# Patient Record
Sex: FEMALE | Race: WHITE | Employment: FULL TIME | URBAN - METROPOLITAN AREA
[De-identification: names, ages, dates, MRNs, and addresses within clinical notes are randomized per-mention and may not be internally consistent; named-entity substitution may affect disease eponyms.]

---

## 2017-03-23 ENCOUNTER — HOSPITAL ENCOUNTER (OUTPATIENT)
Dept: ULTRASOUND IMAGING | Age: 62
Discharge: HOME | End: 2017-03-23
Attending: FAMILY MEDICINE
Payer: COMMERCIAL

## 2017-03-23 DIAGNOSIS — E89.0 HISTORY OF THYROIDECTOMY, SUBTOTAL: ICD-10-CM

## 2017-03-23 DIAGNOSIS — E04.1 THYROID NODULE: ICD-10-CM

## 2017-03-23 PROCEDURE — 76536 US EXAM OF HEAD AND NECK: CPT

## 2017-03-23 NOTE — LETTER
4/3/2017 1:33 PM 
 
Ms. Hoover Grade [address] [place] [address] Dear Sneha Grade: 
 
Please find your most recent results below. Resulted Orders US THYROID/PARATHYROID/SOFT TISS Narrative Thyroid ultrasound HISTORY: Left neck swelling. Partial right thyroidectomy 30 years ago. Real-time sonography of the thyroid gland was performed. No prior studies are 
available for comparison. FINDINGS: The right thyroid lobe measures approximately 1.3 x 3.3 x 1.5 cm. The 
left thyroid lobe measures approximately 3.0 x 5.6 x 2.8 cm. The isthmus 
measures 5 mm. There is a complex cystic appearing mass occupying much of the 
mid and lower pole on the left measuring approximately 2.7 x 3.5 x 2.6 cm. Several additional anechoic and hypoechoic nodules are present elsewhere. The 
Largest lesion represents a cyst at the midpole right lobe measuring up to 1.2 
cm. Impression IMPRESSION: 
1. Complex cystic appearing lesion at the mid and lower pole left lobe measuring 
up to 2.7 cm in size. Tissue sampling would be recommended. 2. Several additional hypoechoic and anechoic nodules without highly suspicious 
features. A follow-up ultrasound in 6 months of these lesions would be 
recommended. DC4 The significant findings in this report have been referred to the Imaging Navigator in order to communicate to the referring provider or his/her designee 
as outlined in Section II.C.2.a.ii-iii of the ACR Practice Guideline for Communication of Diagnostic Imaging Findings. RECOMMENDATIONS: 
 
She has some thyroid nodules that need to be sampled by needle biopsy.  I am going to refer her to the thyroid nodule clinic but make sure we see her again within the next 4 weeks. Please call me if you have any questions: [phone] Sincerely, SFD US LOGIC 7 UNIT 1

## 2017-03-24 PROBLEM — E04.2 MULTIPLE THYROID NODULES: Status: ACTIVE | Noted: 2017-03-24

## 2017-03-24 NOTE — PROGRESS NOTES
She has some thyroid nodules that need to be sampled by needle biopsy. I am going to refer her to the thyroid nodule clinic but make sure we see her again within the next 4 weeks.

## 2017-03-27 ENCOUNTER — HOSPITAL ENCOUNTER (OUTPATIENT)
Dept: MAMMOGRAPHY | Age: 62
Discharge: HOME | End: 2017-03-27
Attending: FAMILY MEDICINE
Payer: COMMERCIAL

## 2017-03-27 PROCEDURE — 77067 SCR MAMMO BI INCL CAD: CPT

## 2018-02-02 PROBLEM — E11.9 TYPE 2 DIABETES MELLITUS WITHOUT COMPLICATION, WITHOUT LONG-TERM CURRENT USE OF INSULIN (HCC): Status: ACTIVE | Noted: 2018-02-02

## 2019-01-18 PROBLEM — N32.81 OAB (OVERACTIVE BLADDER): Status: ACTIVE | Noted: 2019-01-18

## 2019-01-18 PROBLEM — E66.01 SEVERE OBESITY (HCC): Status: ACTIVE | Noted: 2019-01-18

## 2019-07-26 ENCOUNTER — HOSPITAL ENCOUNTER (OUTPATIENT)
Dept: MAMMOGRAPHY | Age: 64
Discharge: HOME | End: 2019-07-26
Attending: FAMILY MEDICINE

## 2019-07-26 DIAGNOSIS — Z12.39 SCREENING FOR BREAST CANCER: ICD-10-CM

## 2019-07-26 NOTE — PROGRESS NOTES
Clint Galarza that she knows that she needs additional views with regards to her mammograms and that they are scheduled.

## 2019-07-30 ENCOUNTER — HOSPITAL ENCOUNTER (OUTPATIENT)
Dept: MAMMOGRAPHY | Age: 64
Discharge: HOME | End: 2019-07-30
Attending: FAMILY MEDICINE
Payer: COMMERCIAL

## 2019-07-30 DIAGNOSIS — R92.8 ABNORMAL SCREENING MAMMOGRAM: ICD-10-CM

## 2019-07-30 PROCEDURE — 77065 DX MAMMO INCL CAD UNI: CPT

## 2019-07-30 PROCEDURE — 76642 ULTRASOUND BREAST LIMITED: CPT

## 2019-07-30 NOTE — PROGRESS NOTES
She has some breast discharge and some benign-appearing debris in her ducts. Refer her to general surgery for an opinion.

## 2019-08-22 PROBLEM — R92.8 ABNORMAL MAMMOGRAM OF RIGHT BREAST: Status: ACTIVE | Noted: 2019-08-22

## 2020-02-13 PROBLEM — N95.2 VAGINAL ATROPHY: Status: ACTIVE | Noted: 2020-02-13

## 2020-11-27 ENCOUNTER — HOSPITAL ENCOUNTER (OUTPATIENT)
Dept: MAMMOGRAPHY | Age: 65
Discharge: HOME | End: 2020-11-27
Attending: NURSE PRACTITIONER
Payer: MEDICARE

## 2020-11-27 DIAGNOSIS — Z12.39 BREAST SCREENING: ICD-10-CM

## 2020-11-27 DIAGNOSIS — Z78.0 POSTMENOPAUSE: ICD-10-CM

## 2020-11-27 PROCEDURE — 77067 SCR MAMMO BI INCL CAD: CPT

## 2020-11-27 PROCEDURE — 77080 DXA BONE DENSITY AXIAL: CPT

## 2020-11-27 NOTE — PROGRESS NOTES
IMPRESSION:   No mammographic evidence of malignancy.        Recommend mammogram in one year. Reminder letter will be scheduled.     BI-RADS Assessment Category 2: Benign finding.      Recommend mammogram in one year. Reminder letter will be scheduled.     BI-RADS Assessment Category 2: Benign finding.

## 2021-04-10 NOTE — H&P
Name: Luz Torres      MRN: [id number]       : 1955       Age: 76 y.o. Sex: female        Nella Rollins MD       CC:  No chief complaint on file. HPI:  The patient is referred here for a colonoscopy by Rey Velasquez NP. I know the patient from incision and drainage of a left buttock abscess x 2 in , followed by a laparoscopic cholecystectomy in . The notes from Rey Velasquez of 21 say that she was referred for a colonoscopy one year ago, but did not go to the appointment. No recent abdominal pain, nausea or vomiting. Family hx of colon cancer NO      Previous colonoscopy YES. 16 years ago. Negative   BRBPR NO   Melena NO   Loss of appetite NO   Weight loss NO      Change in bowel habits NO       HISTORY:    Past Medical History:   Diagnosis Date    Anxiety     Arrhythmia     pt reports hx of occ irreg beats. No medical dx.  Calculus of gallbladder without mention of cholecystitis or obstruction     Depression     Hypertension     Severe obesity ([facility] [address] 2019    Thyroid disease     s/p partial thyroidectomy. no meds    Type 2 diabetes mellitus without complication, without long-term current use of insulin ([address] 2018     Past Surgical History:   Procedure Laterality Date    HX BLADDER SUSPENSION      HX BREAST BIOPSY Right     age 20    HX CHOLECYSTECTOMY  7/17/15    HX CYST INCISION AND DRAINAGE Right     MRSA I&D from buttock    HX HEENT Left     cataract surgery    HX HYSTERECTOMY      HX PARTIAL THYROIDECTOMY      [place] BREAST SURGERY PROCEDURE UNLISTED Right     excision small mass x2  from breast     Prior to Admission medications    Medication Sig Start Date End Date Taking? Authorizing Provider   lisinopril-hydroCHLOROthiazide (PRINZIDE, ZESTORETIC) 10-12.5 mg per tablet Take 1 Tab by mouth daily.  21   Love Barrett NP   metFORMIN (GLUCOPHAGE) 500 mg tablet Take 1 Tab by mouth two (2) times daily (with meals). 1/28/21   Love Barrett NP   potassium chloride SR (KLOR-CON 10) 10 mEq tablet Take 1 Tab by mouth daily. 1/28/21   Love Barrett NP   sertraline (ZOLOFT) 50 mg tablet Take 1 Tab by mouth daily. 1/28/21   Love Barrett NP   glucose blood VI test strips (ONETOUCH ULTRA BLUE TEST STRIP) strip by Does Not Apply route See Admin Instructions. Use to test sugars once a day 6/27/19   García Morton MD   Blood-Glucose Meter monitoring kit She has sugars twice daily mornings and evenings 4/19/18   Jaydon Branch MD   cholecalciferol, vitamin D3, (VITAMIN D3) 2,000 unit tab Take 1 Tab by mouth. Provider, Historical     Social History     Tobacco Use    Smoking status: Never Smoker    Smokeless tobacco: Never Used   Substance Use Topics    Alcohol use: Not Currently    Drug use: No     Family History   Problem Relation Age of Onset    Thyroid Disease Neg Hx     Diabetes Neg Hx     Thyroid Cancer Neg Hx      . No Known Allergies    Physical Exam:     There were no vitals taken for this visit. General: Alert, oriented, cooperative white female in no acute distress. Resp: Breathing is  non-labored. Lungs clear to auscultation without wheezing or rhonchi   CV: RRR. No murmurs, rubs or gallops appreciated. Abd: soft non-tender and non-distended without peritoneal signs. +bs    Extremities: No clubbing, cyanosis or edema. Strength intact. Assessment/Plan:  Cristo Coles is a 76 y.o. female who is here for a colonoscopy as a screening tool. 1. Off ASA for one week, if on aspirin    2. Bowel prep    3. Colonoscopy with MAC. I went through the risks of bleeding, perforation of the colon and cardiopulmonary problems that can develop with the use of anesthesia.     Magda Soto MD    FACS   4/10/2021  3:17 PM

## 2021-04-15 ENCOUNTER — ANESTHESIA EVENT (OUTPATIENT)
Dept: ENDOSCOPY | Age: 66
End: 2021-04-15
Payer: MEDICARE

## 2021-04-16 ENCOUNTER — ANESTHESIA (OUTPATIENT)
Dept: ENDOSCOPY | Age: 66
End: 2021-04-16
Payer: MEDICARE

## 2021-04-16 ENCOUNTER — HOSPITAL ENCOUNTER (OUTPATIENT)
Age: 66
Setting detail: OUTPATIENT SURGERY
Discharge: HOME | End: 2021-04-16
Attending: SURGERY | Admitting: SURGERY
Payer: MEDICARE

## 2021-04-16 VITALS
TEMPERATURE: 97.7 F | RESPIRATION RATE: 16 BRPM | HEIGHT: 61 IN | WEIGHT: 180 LBS | SYSTOLIC BLOOD PRESSURE: 120 MMHG | DIASTOLIC BLOOD PRESSURE: 71 MMHG | OXYGEN SATURATION: 97 % | HEART RATE: 58 BPM | BODY MASS INDEX: 33.99 KG/M2

## 2021-04-16 DIAGNOSIS — Z12.11 ENCOUNTER FOR SCREENING COLONOSCOPY: ICD-10-CM

## 2021-04-16 LAB
GLUCOSE BLD STRIP.AUTO-MCNC: 146 MG/DL (ref 65–100)
SERVICE CMNT-IMP: ABNORMAL

## 2021-04-16 PROCEDURE — 74011000250 HC RX REV CODE- 250: Performed by: REGISTERED NURSE

## 2021-04-16 PROCEDURE — 74011250636 HC RX REV CODE- 250/636: Performed by: REGISTERED NURSE

## 2021-04-16 PROCEDURE — 2709999900 HC NON-CHARGEABLE SUPPLY: Performed by: SURGERY

## 2021-04-16 PROCEDURE — 76060000031 HC ANESTHESIA FIRST 0.5 HR: Performed by: SURGERY

## 2021-04-16 PROCEDURE — 74011250636 HC RX REV CODE- 250/636: Performed by: ANESTHESIOLOGY

## 2021-04-16 PROCEDURE — 76040000025: Performed by: SURGERY

## 2021-04-16 PROCEDURE — 82962 GLUCOSE BLOOD TEST: CPT

## 2021-04-16 PROCEDURE — G0121 COLON CA SCRN NOT HI RSK IND: HCPCS | Performed by: SURGERY

## 2021-04-16 RX ORDER — LIDOCAINE HYDROCHLORIDE 20 MG/ML
INJECTION, SOLUTION EPIDURAL; INFILTRATION; INTRACAUDAL; PERINEURAL AS NEEDED
Status: DISCONTINUED | OUTPATIENT
Start: 2021-04-16 | End: 2021-04-16 | Stop reason: HOSPADM

## 2021-04-16 RX ORDER — SODIUM CHLORIDE 0.9 % (FLUSH) 0.9 %
5-40 SYRINGE (ML) INJECTION EVERY 8 HOURS
Status: DISCONTINUED | OUTPATIENT
Start: 2021-04-16 | End: 2021-04-16 | Stop reason: HOSPADM

## 2021-04-16 RX ORDER — SODIUM CHLORIDE, SODIUM LACTATE, POTASSIUM CHLORIDE, CALCIUM CHLORIDE 600; 310; 30; 20 MG/100ML; MG/100ML; MG/100ML; MG/100ML
100 INJECTION, SOLUTION INTRAVENOUS CONTINUOUS
Status: DISCONTINUED | OUTPATIENT
Start: 2021-04-16 | End: 2021-04-16 | Stop reason: HOSPADM

## 2021-04-16 RX ORDER — PROPOFOL 10 MG/ML
INJECTION, EMULSION INTRAVENOUS
Status: DISCONTINUED | OUTPATIENT
Start: 2021-04-16 | End: 2021-04-16 | Stop reason: HOSPADM

## 2021-04-16 RX ORDER — PROPOFOL 10 MG/ML
INJECTION, EMULSION INTRAVENOUS AS NEEDED
Status: DISCONTINUED | OUTPATIENT
Start: 2021-04-16 | End: 2021-04-16 | Stop reason: HOSPADM

## 2021-04-16 RX ORDER — SODIUM CHLORIDE 0.9 % (FLUSH) 0.9 %
5-40 SYRINGE (ML) INJECTION AS NEEDED
Status: DISCONTINUED | OUTPATIENT
Start: 2021-04-16 | End: 2021-04-16 | Stop reason: HOSPADM

## 2021-04-16 RX ADMIN — LIDOCAINE HYDROCHLORIDE 50 MG: 20 INJECTION, SOLUTION EPIDURAL; INFILTRATION; INTRACAUDAL; PERINEURAL at 10:23

## 2021-04-16 RX ADMIN — PROPOFOL 30 MG: 10 INJECTION, EMULSION INTRAVENOUS at 10:24

## 2021-04-16 RX ADMIN — SODIUM CHLORIDE, SODIUM LACTATE, POTASSIUM CHLORIDE, AND CALCIUM CHLORIDE 100 ML/HR: 600; 310; 30; 20 INJECTION, SOLUTION INTRAVENOUS at 10:10

## 2021-04-16 RX ADMIN — PROPOFOL 50 MG: 10 INJECTION, EMULSION INTRAVENOUS at 10:23

## 2021-04-16 RX ADMIN — PROPOFOL 20 MG: 10 INJECTION, EMULSION INTRAVENOUS at 10:25

## 2021-04-16 RX ADMIN — PROPOFOL 140 MCG/KG/MIN: 10 INJECTION, EMULSION INTRAVENOUS at 10:23

## 2021-04-16 NOTE — ANESTHESIA POSTPROCEDURE EVALUATION
Procedure(s):  COLONOSCOPY/ BMI 34. total IV anesthesia    Anesthesia Post Evaluation        Patient location during evaluation: PACU  Patient participation: complete - patient participated  Level of consciousness: awake and alert  Pain management: adequate  Airway patency: patent  Anesthetic complications: no  Cardiovascular status: acceptable  Respiratory status: acceptable  Hydration status: acceptable  Post anesthesia nausea and vomiting:  none      INITIAL Post-op Vital signs:   Vitals Value Taken Time   /68 04/16/21 1124   Temp 36.5 °C (97.7 °F) 04/16/21 1045   Pulse 59 04/16/21 1127   Resp 16 04/16/21 1114   SpO2 98 % 04/16/21 1126   Vitals shown include unvalidated device data.

## 2021-04-16 NOTE — INTERVAL H&P NOTE
Update History & Physical 
 
The Patient's History and Physical of 4/10/21 was reviewed with the patient and I examined the patient. There was no change. The surgical site was confirmed by the patient and me. Plan:  The risk, benefits, expected outcome, and alternative to the recommended procedure have been discussed with the patient. Patient understands and wants to proceed with the procedure.  
 
Electronically signed by Kiana Valladares MD on 4/16/2021 at 10:04 AM

## 2021-04-16 NOTE — OP NOTES
[facility]  OPERATIVE REPORT    Name:  Hernan Quiroz  MR#:  [id number]  :  1955  ACCOUNT #:  [de-identified]  DATE OF SERVICE:  2021    PREOPERATIVE DIAGNOSIS:  Request for screening colonoscopy. POSTOPERATIVE DIAGNOSES:  1. Good preparation. 2.  No masses, polyps, mucosal abnormalities noted. PROCEDURE PERFORMED:  Colonoscopy    SURGEON:  Latricia Briggs MD    ASSISTANT:  None. ANESTHESIA:  Monitored anesthesia care with Dr. Candace Baird. COMPLICATIONS:  None. SPECIMENS REMOVED:  None. IMPLANTS:  None. ESTIMATED BLOOD LOSS:  None. DRAINS:  None. HISTORY:  A 50-year-old female who I know from several previous procedures. She has had a right buttock abscess incised and drained, a left buttock abscess incised and drained, and I removed her gallbladder. The patient had not had a colonoscopy in 16 years and Dr. Yolande Simms recommended she have a colonoscopy. I went through the procedure, risks of bleeding, infection, anesthesia, perforation of colon. She was agreeable, signed the consent form. PROCEDURE:  The patient was seen in the preop area by herself. Her friend who drove her, Jocelyn Watts, was waiting in the car with her son. She did not come in the building. The patient was transported to room #4 [facility] and placed on the stretcher in left lateral decubitus position. Oxygen via nasal cannula was administered. Time-out was done identifying the patient, surgical procedure, birth date of 1955. When everyone in the room agreed, I began the procedure. The [id number] scope was advanced through the anus and all the way to the cecum. Cecum was identified with the ileocecal valve, cecal folds. External compression of the right lower quadrant corresponded to an indentation seen with the scope. The scope was slowly withdrawn over a 7-minute period of time.   There were no lesions noted in the cecum, ascending colon, transverse colon, descending or sigmoid colon. The scope was brought back to the rectum where it was retroflexed. The patient had no lesions on retroflexion. The scope was withdrawn. Digital rectal exam revealed fair sphincter tone. No masses palpated. FINDINGS:  1. Good prep. 2.  No masses, polyps. PLAN:  Repeat in 10 years or if symptoms develop.       Naveen Urrutia MD      [id number]  D:  04/16/2021 10:57  T:  04/16/2021 16:53  JOB #:  [id number]

## 2021-04-16 NOTE — ROUTINE PROCESS
Patient denies any needs. Vital signs stable. Discharge instructions given to patient and . No other concerns noted at this time. Patient wheeled out to car via wheelchair with staff.

## 2021-04-16 NOTE — ANESTHESIA PREPROCEDURE EVALUATION
Anesthetic History   No history of anesthetic complications            Review of Systems / Medical History  Patient summary reviewed, nursing notes reviewed and pertinent labs reviewed    Pulmonary  Within defined limits                 Neuro/Psych         Psychiatric history     Cardiovascular    Hypertension: well controlled        Dysrhythmias       Exercise tolerance: >4 METS     GI/Hepatic/Renal  Within defined limits              Endo/Other    Diabetes: well controlled, type 2  Hypothyroidism: well controlled  Obesity     Other Findings              Physical Exam    Airway  Mallampati: II      Mouth opening: Normal     Cardiovascular  Regular rate and rhythm,  S1 and S2 normal,  no murmur, click, rub, or gallop             Dental    Dentition: Full upper dentures     Pulmonary  Breath sounds clear to auscultation               Abdominal         Other Findings            Anesthetic Plan    ASA: 2  Anesthesia type: total IV anesthesia          Induction: Intravenous  Anesthetic plan and risks discussed with: Patient

## 2022-03-18 PROBLEM — N32.81 OAB (OVERACTIVE BLADDER): Status: ACTIVE | Noted: 2019-01-18

## 2022-03-19 PROBLEM — E66.01 SEVERE OBESITY (HCC): Status: ACTIVE | Noted: 2019-01-18

## 2022-03-19 PROBLEM — E04.2 MULTINODULAR GOITER: Status: ACTIVE | Noted: 2017-03-24

## 2022-03-19 PROBLEM — R92.8 ABNORMAL MAMMOGRAM OF RIGHT BREAST: Status: ACTIVE | Noted: 2019-08-22

## 2022-03-19 PROBLEM — E11.9 TYPE 2 DIABETES MELLITUS WITHOUT COMPLICATION, WITHOUT LONG-TERM CURRENT USE OF INSULIN (HCC): Status: ACTIVE | Noted: 2018-02-02

## 2022-03-20 PROBLEM — N95.2 VAGINAL ATROPHY: Status: ACTIVE | Noted: 2020-02-13

## 2022-04-26 ENCOUNTER — HOSPITAL ENCOUNTER (OUTPATIENT)
Dept: MAMMOGRAPHY | Age: 67
Discharge: HOME | End: 2022-04-26
Attending: NURSE PRACTITIONER
Payer: MEDICARE

## 2022-04-26 DIAGNOSIS — Z12.39 BREAST SCREENING: ICD-10-CM

## 2022-04-26 PROCEDURE — 77067 SCR MAMMO BI INCL CAD: CPT

## 2022-04-27 NOTE — PROGRESS NOTES
IMPRESSION  1. Negative bilateral mammogram.  2. Routine screening mammogram is recommended in one year.     BI-RADS 1: Negative.

## 2023-01-30 NOTE — TELEPHONE ENCOUNTER
Patient last seen 4/20/22. (Next appt scheduled for 4/20/23). Metformin 500mg BID confirmed in that office note. Called [facility] in [facility] & spoke to Patti, who confirmed that no refills remain on current medication & a new refill will need to be sent in.  Medication pended & sent to provider for review & approval.

## 2023-03-31 ENCOUNTER — APPOINTMENT (OUTPATIENT)
Dept: CT IMAGING | Age: 68
End: 2023-03-31
Payer: MEDICARE

## 2023-03-31 ENCOUNTER — APPOINTMENT (OUTPATIENT)
Dept: GENERAL RADIOLOGY | Age: 68
End: 2023-03-31
Payer: MEDICARE

## 2023-03-31 ENCOUNTER — HOSPITAL ENCOUNTER (EMERGENCY)
Age: 68
Discharge: HOME | End: 2023-03-31
Attending: EMERGENCY MEDICINE
Payer: MEDICARE

## 2023-03-31 VITALS
RESPIRATION RATE: 16 BRPM | OXYGEN SATURATION: 100 % | TEMPERATURE: 97.6 F | DIASTOLIC BLOOD PRESSURE: 63 MMHG | SYSTOLIC BLOOD PRESSURE: 115 MMHG | HEART RATE: 66 BPM | BODY MASS INDEX: 32.1 KG/M2 | WEIGHT: 170 LBS | HEIGHT: 61 IN

## 2023-03-31 DIAGNOSIS — H81.10 BENIGN PAROXYSMAL POSITIONAL VERTIGO, UNSPECIFIED LATERALITY: Primary | ICD-10-CM

## 2023-03-31 DIAGNOSIS — R53.1 GENERAL WEAKNESS: ICD-10-CM

## 2023-03-31 LAB
ALBUMIN SERPL-MCNC: 3.8 G/DL (ref 3.2–4.6)
ALBUMIN/GLOB SERPL: 1 (ref 0.4–1.6)
ALP SERPL-CCNC: 99 U/L (ref 50–136)
ALT SERPL-CCNC: 24 U/L (ref 12–65)
ANION GAP SERPL CALC-SCNC: 7 MMOL/L (ref 2–11)
AST SERPL-CCNC: 17 U/L (ref 15–37)
BASOPHILS # BLD: 0 K/UL (ref 0–0.2)
BASOPHILS NFR BLD: 1 % (ref 0–2)
BILIRUB SERPL-MCNC: 0.6 MG/DL (ref 0.2–1.1)
BILIRUB UR QL: NEGATIVE
BUN SERPL-MCNC: 17 MG/DL (ref 8–23)
CALCIUM SERPL-MCNC: 9.4 MG/DL (ref 8.3–10.4)
CHLORIDE SERPL-SCNC: 106 MMOL/L (ref 101–110)
CO2 SERPL-SCNC: 24 MMOL/L (ref 21–32)
CREAT SERPL-MCNC: 1 MG/DL (ref 0.6–1)
DIFFERENTIAL METHOD BLD: NORMAL
EOSINOPHIL # BLD: 0.1 K/UL (ref 0–0.8)
EOSINOPHIL NFR BLD: 1 % (ref 0.5–7.8)
ERYTHROCYTE [DISTWIDTH] IN BLOOD BY AUTOMATED COUNT: 12.8 % (ref 11.9–14.6)
GLOBULIN SER CALC-MCNC: 3.7 G/DL (ref 2.8–4.5)
GLUCOSE SERPL-MCNC: 201 MG/DL (ref 65–100)
GLUCOSE UR QL STRIP.AUTO: NEGATIVE MG/DL
HCT VFR BLD AUTO: 38.7 % (ref 35.8–46.3)
HGB BLD-MCNC: 13 G/DL (ref 11.7–15.4)
IMM GRANULOCYTES # BLD AUTO: 0 K/UL (ref 0–0.5)
IMM GRANULOCYTES NFR BLD AUTO: 0 % (ref 0–5)
KETONES UR-MCNC: NEGATIVE MG/DL
LACTATE SERPL-SCNC: 2 MMOL/L (ref 0.4–2)
LACTATE SERPL-SCNC: 3.1 MMOL/L (ref 0.4–2)
LEUKOCYTE ESTERASE UR QL STRIP: NEGATIVE
LYMPHOCYTES # BLD: 1.5 K/UL (ref 0.5–4.6)
LYMPHOCYTES NFR BLD: 18 % (ref 13–44)
MCH RBC QN AUTO: 28.8 PG (ref 26.1–32.9)
MCHC RBC AUTO-ENTMCNC: 33.6 G/DL (ref 31.4–35)
MCV RBC AUTO: 85.6 FL (ref 82–102)
MONOCYTES # BLD: 0.3 K/UL (ref 0.1–1.3)
MONOCYTES NFR BLD: 4 % (ref 4–12)
NEUTS SEG # BLD: 6.1 K/UL (ref 1.7–8.2)
NEUTS SEG NFR BLD: 76 % (ref 43–78)
NITRITE UR QL: NEGATIVE
NRBC # BLD: 0 K/UL (ref 0–0.2)
PH UR: 6.5 (ref 5–9)
PLATELET # BLD AUTO: 224 K/UL (ref 150–450)
PMV BLD AUTO: 10 FL (ref 9.4–12.3)
POTASSIUM SERPL-SCNC: 4.1 MMOL/L (ref 3.5–5.1)
PROT SERPL-MCNC: 7.5 G/DL (ref 6.3–8.2)
PROT UR QL: NEGATIVE MG/DL
RBC # BLD AUTO: 4.52 M/UL (ref 4.05–5.2)
RBC # UR STRIP: NEGATIVE
SERVICE CMNT-IMP: NORMAL
SODIUM SERPL-SCNC: 137 MMOL/L (ref 133–143)
SP GR UR: 1.02 (ref 1–1.02)
TROPONIN I SERPL HS-MCNC: <3 PG/ML (ref 0–37)
UROBILINOGEN UR QL: 0.2 EU/DL (ref 0.2–1)
WBC # BLD AUTO: 8.1 K/UL (ref 4.3–11.1)

## 2023-03-31 PROCEDURE — 80053 COMPREHEN METABOLIC PANEL: CPT

## 2023-03-31 PROCEDURE — 6360000002 HC RX W HCPCS: Performed by: EMERGENCY MEDICINE

## 2023-03-31 PROCEDURE — 96361 HYDRATE IV INFUSION ADD-ON: CPT

## 2023-03-31 PROCEDURE — 2580000003 HC RX 258: Performed by: EMERGENCY MEDICINE

## 2023-03-31 PROCEDURE — 81003 URINALYSIS AUTO W/O SCOPE: CPT

## 2023-03-31 PROCEDURE — 84484 ASSAY OF TROPONIN QUANT: CPT

## 2023-03-31 PROCEDURE — 96360 HYDRATION IV INFUSION INIT: CPT

## 2023-03-31 PROCEDURE — 71045 X-RAY EXAM CHEST 1 VIEW: CPT

## 2023-03-31 PROCEDURE — 6370000000 HC RX 637 (ALT 250 FOR IP): Performed by: EMERGENCY MEDICINE

## 2023-03-31 PROCEDURE — 74177 CT ABD & PELVIS W/CONTRAST: CPT

## 2023-03-31 PROCEDURE — 70450 CT HEAD/BRAIN W/O DYE: CPT

## 2023-03-31 PROCEDURE — 85025 COMPLETE CBC W/AUTO DIFF WBC: CPT

## 2023-03-31 PROCEDURE — 96375 TX/PRO/DX INJ NEW DRUG ADDON: CPT

## 2023-03-31 PROCEDURE — 99285 EMERGENCY DEPT VISIT HI MDM: CPT

## 2023-03-31 PROCEDURE — 96365 THER/PROPH/DIAG IV INF INIT: CPT

## 2023-03-31 PROCEDURE — 6360000004 HC RX CONTRAST MEDICATION: Performed by: EMERGENCY MEDICINE

## 2023-03-31 PROCEDURE — 87040 BLOOD CULTURE FOR BACTERIA: CPT

## 2023-03-31 PROCEDURE — 83605 ASSAY OF LACTIC ACID: CPT

## 2023-03-31 RX ORDER — 0.9 % SODIUM CHLORIDE 0.9 %
100 INTRAVENOUS SOLUTION INTRAVENOUS ONCE
Status: COMPLETED | OUTPATIENT
Start: 2023-03-31 | End: 2023-03-31

## 2023-03-31 RX ORDER — MECLIZINE HYDROCHLORIDE 25 MG/1
25 TABLET ORAL 3 TIMES DAILY PRN
Qty: 15 TABLET | Refills: 0 | Status: SHIPPED | OUTPATIENT
Start: 2023-03-31 | End: 2023-04-10

## 2023-03-31 RX ORDER — MECLIZINE HYDROCHLORIDE 25 MG/1
50 TABLET ORAL
Status: COMPLETED | OUTPATIENT
Start: 2023-03-31 | End: 2023-03-31

## 2023-03-31 RX ORDER — ONDANSETRON 2 MG/ML
4 INJECTION INTRAMUSCULAR; INTRAVENOUS ONCE
Status: COMPLETED | OUTPATIENT
Start: 2023-03-31 | End: 2023-03-31

## 2023-03-31 RX ORDER — 0.9 % SODIUM CHLORIDE 0.9 %
30 INTRAVENOUS SOLUTION INTRAVENOUS
Status: COMPLETED | OUTPATIENT
Start: 2023-03-31 | End: 2023-03-31

## 2023-03-31 RX ORDER — SODIUM CHLORIDE 0.9 % (FLUSH) 0.9 %
10 SYRINGE (ML) INJECTION
Status: COMPLETED | OUTPATIENT
Start: 2023-03-31 | End: 2023-03-31

## 2023-03-31 RX ORDER — 0.9 % SODIUM CHLORIDE 0.9 %
1000 INTRAVENOUS SOLUTION INTRAVENOUS ONCE
Status: COMPLETED | OUTPATIENT
Start: 2023-03-31 | End: 2023-03-31

## 2023-03-31 RX ADMIN — CEFTRIAXONE 1000 MG: 1 INJECTION, POWDER, FOR SOLUTION INTRAMUSCULAR; INTRAVENOUS at 19:47

## 2023-03-31 RX ADMIN — ONDANSETRON 4 MG: 2 INJECTION INTRAMUSCULAR; INTRAVENOUS at 18:36

## 2023-03-31 RX ADMIN — SODIUM CHLORIDE 100 ML: 9 INJECTION, SOLUTION INTRAVENOUS at 20:38

## 2023-03-31 RX ADMIN — SODIUM CHLORIDE 2313 ML: 9 INJECTION, SOLUTION INTRAVENOUS at 19:47

## 2023-03-31 RX ADMIN — IOPAMIDOL 100 ML: 755 INJECTION, SOLUTION INTRAVENOUS at 20:38

## 2023-03-31 RX ADMIN — SODIUM CHLORIDE, PRESERVATIVE FREE 10 ML: 5 INJECTION INTRAVENOUS at 20:39

## 2023-03-31 RX ADMIN — MECLIZINE HYDROCHLORIDE 50 MG: 25 TABLET ORAL at 18:36

## 2023-03-31 RX ADMIN — SODIUM CHLORIDE 1000 ML: 9 INJECTION, SOLUTION INTRAVENOUS at 18:36

## 2023-03-31 ASSESSMENT — PAIN DESCRIPTION - DESCRIPTORS: DESCRIPTORS: SHOOTING

## 2023-03-31 ASSESSMENT — PAIN DESCRIPTION - LOCATION: LOCATION: ABDOMEN

## 2023-03-31 ASSESSMENT — PAIN SCALES - GENERAL: PAINLEVEL_OUTOF10: 3

## 2023-03-31 ASSESSMENT — ENCOUNTER SYMPTOMS
NAUSEA: 1
BLOOD IN STOOL: 0

## 2023-03-31 ASSESSMENT — LIFESTYLE VARIABLES
HOW OFTEN DO YOU HAVE A DRINK CONTAINING ALCOHOL: NEVER
HOW MANY STANDARD DRINKS CONTAINING ALCOHOL DO YOU HAVE ON A TYPICAL DAY: PATIENT DOES NOT DRINK

## 2023-03-31 ASSESSMENT — PAIN - FUNCTIONAL ASSESSMENT: PAIN_FUNCTIONAL_ASSESSMENT: NONE - DENIES PAIN

## 2023-03-31 NOTE — ED PROVIDER NOTES
extracranial soft tissues:    Normal calvarium. Orbits unremarkable. The visualized paranasal sinuses are   clear. No mastoid or middle ear effusions. Impression    1. No acute intracranial abnormality. 2.  Age commensurate senescent changes. Erika Miller M.D.   3/31/2023 6:56:00 PM   CT ABDOMEN PELVIS W IV CONTRAST Additional Contrast? None    Narrative    CT OF THE ABDOMEN AND PELVIS WITH CONTRAST    Clinical indication: Abdominal pain. Comparison: None. Procedure: Iodinated contrast was administered intravenously without incident. CT images of the abdomen and pelvis were obtained. CT dose lowering techniques   were used, to include: automated exposure control, adjustment for patient size,   and or use of iterative reconstruction. Findings:     Lung Bases: Lung bases are clear. No pleural effusion. Heart size is normal   without pericardial effusion. Liver and biliary system: The liver is normal in size and configuration without   focal lesion. No intra or extrahepatic biliary ductal dilatation is noted. The   gallbladder is removed. Pancreas: The pancreas is unremarkable. Spleen: The spleen is normal.    Adrenals: The adrenal glands are within normal limits. Kidneys: The kidneys are symmetric in size and enhancement and without   hydronephrosis. Gastrointestinal: The stomach and scattered loops of small and large bowel have   a nonobstructive pattern. No focal mucosal lesion or inflammatory changes are   seen. The appendix is normal in the right lower quadrant. Mesentery and retroperitoneum: No mesenteric or retroperitoneal adenopathy. No   abnormal fluid collection, mass or free air. Pelvis: The urinary bladder is moderately distended with a smooth contour. Rectum is normal. No free fluid. No deep pelvic or inguinal adenopathy. Reproductive: No acute abnormality. Body wall: Normal.    Bones: No acute osseous abnormality.       Impression Impression: No acute abnormality seen in the abdomen or pelvis.      Lanre Porter M.D.   3/31/2023 9:22:00 PM   CBC with Auto Differential   Result Value Ref Range    WBC 8.1 4.3 - 11.1 K/uL    RBC 4.52 4.05 - 5.2 M/uL    Hemoglobin 13.0 11.7 - 15.4 g/dL    Hematocrit 38.7 35.8 - 46.3 %    MCV 85.6 82 - 102 FL    MCH 28.8 26.1 - 32.9 PG    MCHC 33.6 31.4 - 35.0 g/dL    RDW 12.8 11.9 - 14.6 %    Platelets 932 160 - 497 K/uL    MPV 10.0 9.4 - 12.3 FL    nRBC 0.00 0.0 - 0.2 K/uL    Differential Type AUTOMATED      Seg Neutrophils 76 43 - 78 %    Lymphocytes 18 13 - 44 %    Monocytes 4 4.0 - 12.0 %    Eosinophils % 1 0.5 - 7.8 %    Basophils 1 0.0 - 2.0 %    Immature Granulocytes 0 0.0 - 5.0 %    Segs Absolute 6.1 1.7 - 8.2 K/UL    Absolute Lymph # 1.5 0.5 - 4.6 K/UL    Absolute Mono # 0.3 0.1 - 1.3 K/UL    Absolute Eos # 0.1 0.0 - 0.8 K/UL    Basophils Absolute 0.0 0.0 - 0.2 K/UL    Absolute Immature Granulocyte 0.0 0.0 - 0.5 K/UL   CMP   Result Value Ref Range    Sodium 137 133 - 143 mmol/L    Potassium 4.1 3.5 - 5.1 mmol/L    Chloride 106 101 - 110 mmol/L    CO2 24 21 - 32 mmol/L    Anion Gap 7 2 - 11 mmol/L    Glucose 201 (H) 65 - 100 mg/dL    BUN 17 8 - 23 MG/DL    Creatinine 1.00 0.6 - 1.0 MG/DL    Est, Glom Filt Rate >60 >60 ml/min/1.73m2    Calcium 9.4 8.3 - 10.4 MG/DL    Total Bilirubin 0.6 0.2 - 1.1 MG/DL    ALT 24 12 - 65 U/L    AST 17 15 - 37 U/L    Alk Phosphatase 99 50 - 136 U/L    Total Protein 7.5 6.3 - 8.2 g/dL    Albumin 3.8 3.2 - 4.6 g/dL    Globulin 3.7 2.8 - 4.5 g/dL    Albumin/Globulin Ratio 1.0 0.4 - 1.6     Lactate, Sepsis   Result Value Ref Range    Lactic Acid, Sepsis 3.1 (H) 0.4 - 2.0 MMOL/L   Lactate, Sepsis   Result Value Ref Range    Lactic Acid, Sepsis 2.0 0.4 - 2.0 MMOL/L   Troponin   Result Value Ref Range    Troponin, High Sensitivity <3.0 0 - 37 pg/mL   POCT Urinalysis no Micro   Result Value Ref Range    Specific Gravity, Urine, POC 1.020 1.001 - 1.023      pH, Urine, POC 6.5 5.0 -

## 2023-03-31 NOTE — ED TRIAGE NOTES
Pt arrives to ER via EMS from home with reports of dizziness, nausea, vomiting x 1 day. Pt denies SOB, CP. Endorses abd pain. EMS vitals include 164/68, 80HR, 196bgl, 100% on RA. EMS gave 4mg zofran, placed 20g left ac.

## 2023-04-02 LAB
BACTERIA SPEC CULT: NORMAL
BACTERIA SPEC CULT: NORMAL
SERVICE CMNT-IMP: NORMAL
SERVICE CMNT-IMP: NORMAL

## 2023-04-03 ENCOUNTER — CARE COORDINATION (OUTPATIENT)
Dept: CARE COORDINATION | Facility: CLINIC | Age: 68
End: 2023-04-03

## 2023-04-03 NOTE — CARE COORDINATION
Ambulatory Care Management Outreach Attempt    This patient was received as a referral from  Daily assignment for case management of ED Utilizer. Attempted to reach patient for ED follow up. Unable to reach patient. Patient: Anastasia Kinsey Patient : 1955 MRN: [id number]    Last Discharge  Street       Date Complaint Diagnosis Description Type Department Provider    3/31/23 Emesis Benign paroxysmal positional vertigo, unspecified laterality . ..  ED (DISCHARGE) [facility] Ella Marsh MD                Noted following upcoming appointments from discharge chart review:   [facility] follow up appointment(s):   Future Appointments   Date Time Provider Ximena Brooks   2023  1:00 PM NIKA Iyer - CNP [facility]   2024 10:00 AM Alexandr Rivera MD [facility] AM[facility]

## 2023-04-04 ENCOUNTER — CARE COORDINATION (OUTPATIENT)
Dept: CARE COORDINATION | Facility: CLINIC | Age: 68
End: 2023-04-04

## 2023-04-04 NOTE — CARE COORDINATION
2nd attempt to reach pt to offer ACM services. Unable to connect with pt, will attempt again in 5 working days.

## 2023-04-07 ENCOUNTER — CARE COORDINATION (OUTPATIENT)
Dept: CARE COORDINATION | Facility: CLINIC | Age: 68
End: 2023-04-07

## 2023-04-19 ENCOUNTER — CARE COORDINATION (OUTPATIENT)
Dept: CARE COORDINATION | Facility: CLINIC | Age: 68
End: 2023-04-19

## 2023-04-19 NOTE — CARE COORDINATION
Ambulatory Care Management Outreach Attempt    3rd and final attempt to reach pt. I will be sending a letter int he mail and signing off on pt's chart. Attempted to reach patient for ED follow up. Unable to reach patient. Patient: Scar Delgadillo Patient : 1955 MRN: [id number]    Last Discharge  Street       Date Complaint Diagnosis Description Type Department Provider    3/31/23 Emesis Benign paroxysmal positional vertigo, unspecified laterality . .. ED (DISCHARGE) [facility] Ella Jones MD                Noted following upcoming appointments from discharge chart review:   [facility] follow up appointment(s):   Future Appointments   Date Time Provider Ximena Brooks   2024 10:00 AM Estephanie Acevedo MD [facility]       Dear Scar Delgadillo    My name is Taqueria Castillo and I am a Ambulatory Care Manager who partners with Los Hernandez MD) to improve patients' health. I've been trying to reach you via phone to let you know that, as a member of your care team, I will work with other providers involved in your care, offer education for your specific health conditions, and connect you with more resources as needed. This program is designed to provide you with the opportunity to have a ([facility]) care manager partner with you for the following situations:     1) if you come home from the hospital or emergency room   2) to help manage your disease   3) when you would like assistance coordinating services or appointments    This added support is provided at no additional cost to you. My primary focus is to help you achieve specific goals and improve your health. Please call me at [phone] to further discuss how I can support your health care needs.     Sincerely,    Taqueria Castillo

## 2023-08-22 NOTE — PROGRESS NOTES
LVm requesting a call back 68-year-old male patient, history of hypertension, hyperlipidemia, hypothyroidism and recovering from fall with a pelvic fracture within 6 months or so.  Patient medication list includes a Zartan for blood pressure a statin for hyperlipidemia and Synthroid for hypothyroidism.  No allergies to medication.  Patient presents today for episode of expressive aphasia that started around 2 PM and was witnessed by wife.  Episode was very sudden and patient could not express what he was trying to say.  When asked was asking what he was trying to say he just kept saying I cannot, I cannot.  Patient had been in his usual state of health this morning and was speaking normally and walking normally and then the wife noticed the deficit around 2 PM.  No focal weakness or sensation deficit otherwise.  Patient has never had any history like this in the past.  Patient has a distant history of a seizure disorder that started when he was a teenager and he has been on phenobarbital since.  Last seizure episode was approximately 40 years ago.   No headache no neck pain no fever no chills no shortness of breath no palpitations no diarrhea no fever today.  Of note patient has been recovering from a small lateral shoulder abscess that has been healing well and has been followed up by surgery.

## 2024-05-22 ENCOUNTER — TELEPHONE (OUTPATIENT)
Dept: PHARMACY | Facility: CLINIC | Age: 69
End: 2024-05-22

## 2024-05-22 NOTE — TELEPHONE ENCOUNTER
Dr. Leah Beltrán has an appointment with you tomorrow, 5/23/24, and has been identified with a care gap for Statin Use in Person With Diabetes (SUPD).    Per patient chart review: Has not taken a statin in the past per chart review and is an eligible candidate for statin therapy and initiation at next Office visit.     ADA 2023/ 2018 ACC Guidelines indicate the patient is a candidate for a high-intensity statin.    2018 ACC/AHA/ 2023 ADA Guidelines:  40-76 yo Male; Patient has:Adults 40 to 75 years of age With Diabetes (without clinical ASCVD) and  LDL 70 to 189 mg/dL (93) and elevated 10-year risk of ASCVD (ASCVD risk >20% (22%)  )- Elevated/High Risk (ACC 2018)  LDL Target goal: <70mg/dL- Aged 40-76 yo with Diabetes     Would patient benefit from statin therapy?    Thank you,  Raquel Arenas, PharmD, [facility]  [facility]  [facility] Clinical Pharmacist  Department: [phone]    =============================================================================== and [facility] CLINICAL PHARMACY: STATIN THERAPY REVIEW  Identified statin use in persons with diabetes care gap per [employer]. Records dated: 4/1/24.       STATIN GAP IDENTIFIED- Assessment      Per chart review: Chart Review: has not taken a statin in the past per chart review. And is an eligible candidate for statin therapy and initiation at next Office visit.  No Known Allergies  Lipid evaluation and Guideline Assessment      Lab Results   Component Value Date    CHOL 179 04/20/2022    TRIG 199 (H) 04/20/2022    HDL 52 04/20/2022      Latest Reference Range & Units 04/20/22 15:11   LDL Cholesterol 0 - 99 mg/dL 93     ALT   Date Value Ref Range Status   03/31/2023 24 12 - 65 U/L Final     AST   Date Value Ref Range Status   03/31/2023 17 15 - 37 U/L Final     The 10-year ASCVD risk score (Daya SANCHES, et al., 2019) is: 22.1%    Values used to calculate the score:      Age: 68 years      Sex: Female      Is

## 2024-05-23 NOTE — TELEPHONE ENCOUNTER
Thank you for the update.     Raquel Arenas, PharmD, [facility]  [facility]  [facility] Clinical Pharmacist  Department: [phone]

## 2024-06-18 ENCOUNTER — TELEPHONE (OUTPATIENT)
Dept: PHARMACY | Facility: CLINIC | Age: 69
End: 2024-06-18

## 2024-06-18 NOTE — TELEPHONE ENCOUNTER
Meghan Beltrán has an appointment with you tomorrow, 5/23/24, and has been identified with a care gap for Statin Use in Person With Diabetes (SUPD).     Per patient chart review: Has not taken a statin in the past per chart review and is an eligible candidate for statin therapy and initiation at next Office visit.      ADA 2023/ 2018 ACC Guidelines indicate the patient is a candidate for a high-intensity statin.     2018 ACC/AHA/ 2023 ADA Guidelines:  Patient has:Adults 40 to 75 years of age With Diabetes (without clinical ASCVD) and  LDL 70 to 189 mg/dL (93) and elevated 10-year risk of ASCVD (ASCVD risk >20% (22%)  )- Elevated/High Risk (ACC 2018)  LDL Target goal: <70mg/dL- Aged 40-74 yo with Diabetes     Will fax pcp.      Thank you,  Raquel Arenas, PharmD, [facility]  [facility] Clinical Pharmacist  Department: [phone]    For Pharmacy Admin Tracking Only    Program: [facility] [employer]  CPA in place:  No  Recommendation Provided To: Provider: 1 via Fax sent to office  Intervention Detail: New Rx: 1, reason: Needs Additional Therapy  Intervention Accepted By: Provider: 0  Gap Closed?: No   Time Spent (min): 10

## 2024-07-26 ENCOUNTER — APPOINTMENT (OUTPATIENT)
Dept: GENERAL RADIOLOGY | Age: 69
End: 2024-07-26
Payer: MEDICARE

## 2024-07-26 ENCOUNTER — HOSPITAL ENCOUNTER (EMERGENCY)
Age: 69
Discharge: HOME | End: 2024-07-26
Attending: EMERGENCY MEDICINE
Payer: MEDICARE

## 2024-07-26 VITALS
WEIGHT: 171 LBS | TEMPERATURE: 100.2 F | OXYGEN SATURATION: 100 % | SYSTOLIC BLOOD PRESSURE: 139 MMHG | RESPIRATION RATE: 18 BRPM | DIASTOLIC BLOOD PRESSURE: 66 MMHG | HEART RATE: 99 BPM | BODY MASS INDEX: 32.28 KG/M2 | HEIGHT: 61 IN

## 2024-07-26 DIAGNOSIS — U07.1 COVID-19: Primary | ICD-10-CM

## 2024-07-26 LAB
SARS-COV-2 RDRP RESP QL NAA+PROBE: DETECTED
SOURCE: ABNORMAL

## 2024-07-26 PROCEDURE — 87635 SARS-COV-2 COVID-19 AMP PRB: CPT

## 2024-07-26 PROCEDURE — 71046 X-RAY EXAM CHEST 2 VIEWS: CPT

## 2024-07-26 PROCEDURE — 99284 EMERGENCY DEPT VISIT MOD MDM: CPT

## 2024-07-26 ASSESSMENT — ENCOUNTER SYMPTOMS
NAUSEA: 0
ABDOMINAL PAIN: 0
RHINORRHEA: 0
SORE THROAT: 0
CONSTIPATION: 0
VOMITING: 0
SHORTNESS OF BREATH: 0
COUGH: 1
COLOR CHANGE: 0
BACK PAIN: 0
DIARRHEA: 0

## 2024-07-26 NOTE — ED PROVIDER NOTES
Emergency Department Provider Note       PCP: Phong Stanley MD   Age: 69 y.o.   Sex: female     DISPOSITION Decision To Discharge 07/26/2024 08:09:58 PM       ICD-10-CM    1. COVID-19  U07.1           Medical Decision Making     Patient with coworker with COVID.  She is positive for COVID here.  No acute on chest x-ray.  Will discharge with rest at home.     1 or more acute illnesses that pose a threat to life or bodily function.   Patient was discharged risks and benefits of hospitalization were considered.  Shared medical decision making was utilized in creating the patients health plan today.    I independently ordered and reviewed each unique test.       I interpreted the X-rays no infiltrate.              History     Patient started yesterday with bodyaches cough congestion and headache.  Continued today.  Recent COVID person at work.    The history is provided by the patient. No  was used.   Generalized Body Aches  Severity:  Moderate  Duration:  1 day  Timing:  Constant  Progression:  Worsening  Chronicity:  New  Associated symptoms: congestion, cough, fatigue, fever, headaches and myalgias    Associated symptoms: no abdominal pain, no chest pain, no diarrhea, no nausea, no rash, no rhinorrhea, no shortness of breath, no sore throat and no vomiting        ROS     Review of Systems   Constitutional:  Positive for fatigue and fever. Negative for chills.   HENT:  Positive for congestion. Negative for rhinorrhea and sore throat.    Respiratory:  Positive for cough. Negative for shortness of breath.    Cardiovascular:  Negative for chest pain and palpitations.   Gastrointestinal:  Negative for abdominal pain, constipation, diarrhea, nausea and vomiting.   Genitourinary:  Negative for dysuria and hematuria.   Musculoskeletal:  Positive for myalgias. Negative for back pain and neck pain.   Skin:  Negative for color change and rash.   Neurological:  Positive for headaches. Negative for

## 2024-07-26 NOTE — ED TRIAGE NOTES
Patient arrives to ED pov from home. Patient reports pain all over, cough, and headache. Patient reports this started last night. Patient reports someone at her work tested positive for Covid.

## 2024-07-27 NOTE — ED NOTES
Patient mobility status  with no difficulty. Provider aware     I have reviewed discharge instructions with the patient.  The patient verbalized understanding.    Patient left ED via Discharge Method: ambulatory to Home with  self .    Opportunity for questions and clarification provided.     Patient given 0 scripts.            Guerda Garcia RN  07/26/24 2037

## 2024-12-10 ENCOUNTER — OFFICE VISIT (OUTPATIENT)
Dept: ENDOCRINOLOGY | Age: 69
End: 2024-12-10
Payer: MEDICARE

## 2024-12-10 VITALS
SYSTOLIC BLOOD PRESSURE: 122 MMHG | WEIGHT: 169.6 LBS | OXYGEN SATURATION: 96 % | HEART RATE: 75 BPM | BODY MASS INDEX: 33.3 KG/M2 | HEIGHT: 60 IN | DIASTOLIC BLOOD PRESSURE: 84 MMHG

## 2024-12-10 DIAGNOSIS — E04.2 MULTINODULAR GOITER: Primary | ICD-10-CM

## 2024-12-10 DIAGNOSIS — E04.2 MULTINODULAR GOITER: ICD-10-CM

## 2024-12-10 LAB — TSH W FREE THYROID IF ABNORMAL: 1.2 UIU/ML (ref 0.27–4.2)

## 2024-12-10 PROCEDURE — 76536 US EXAM OF HEAD AND NECK: CPT | Performed by: INTERNAL MEDICINE

## 2024-12-10 PROCEDURE — 3074F SYST BP LT 130 MM HG: CPT | Performed by: INTERNAL MEDICINE

## 2024-12-10 PROCEDURE — 1123F ACP DISCUSS/DSCN MKR DOCD: CPT | Performed by: INTERNAL MEDICINE

## 2024-12-10 PROCEDURE — 3079F DIAST BP 80-89 MM HG: CPT | Performed by: INTERNAL MEDICINE

## 2024-12-10 PROCEDURE — 1159F MED LIST DOCD IN RCRD: CPT | Performed by: INTERNAL MEDICINE

## 2024-12-10 PROCEDURE — 99213 OFFICE O/P EST LOW 20 MIN: CPT | Performed by: INTERNAL MEDICINE

## 2024-12-10 RX ORDER — GLIMEPIRIDE 2 MG/1
2 TABLET ORAL DAILY
COMMUNITY
Start: 2023-12-31

## 2024-12-10 ASSESSMENT — ENCOUNTER SYMPTOMS
DIARRHEA: 0
CONSTIPATION: 0

## 2024-12-10 NOTE — PROGRESS NOTES
measuring 0.33 cm (TR 4).  In the mid left lobe there is a hypoechoic nodule with macrocalcifications measuring 0.98 x 1.33 x 1.58 cm (TR 4).  Just superiorly is a complex, predominantly solid isoechoic nodule measuring 0.91 x 0.97 x 0.94 cm (TR 3).  In the inferior left lobe medially there is a complex, predominantly solid isoechoic nodule measuring 1.09 x 1.68 x 2.08 cm without microcalcifications (TR 3).      Labs:  3/16/2017: TSH 2.390, total T4 8.9.  7/27/2017: TSH 0.926, total T4 7.7.  1/30/2020: TSH 1.100, free T4 1.11.  5/14/2021: TSH 0.667.      Review of Systems   Constitutional:  Negative for diaphoresis, fatigue and unexpected weight change.   Cardiovascular:  Negative for palpitations.   Gastrointestinal:  Negative for constipation and diarrhea.   Endocrine: Negative for cold intolerance and heat intolerance.   Neurological:  Negative for tremors.       Vital Signs:  /84 (Site: Right Upper Arm, Position: Sitting, Cuff Size: Medium Adult)   Pulse 75   Ht 1.524 m (5')   Wt 76.9 kg (169 lb 9.6 oz)   SpO2 96%   BMI 33.12 kg/m²     Wt Readings from Last 3 Encounters:   12/10/24 76.9 kg (169 lb 9.6 oz)   07/26/24 77.6 kg (171 lb)   06/15/23 77.1 kg (170 lb)       Physical Exam  Constitutional:       General: She is not in acute distress.  Neck:      Comments: Thyroidectomy scar.  3 cm left thyroid nodule.   Cardiovascular:      Rate and Rhythm: Normal rate and regular rhythm.   Lymphadenopathy:      Cervical: No cervical adenopathy.   Neurological:      Motor: No tremor.           Orders Placed This Encounter   Procedures    US,HEAD/NECK TISSUES,REAL TIME    TSH with Reflex     Standing Status:   Future     Standing Expiration Date:   12/10/2025       Current Outpatient Medications   Medication Sig Dispense Refill    empagliflozin (JARDIANCE) 25 MG tablet Take 1 tablet by mouth daily      glimepiride (AMARYL) 2 MG tablet Take 1 tablet by mouth daily      lisinopril-hydroCHLOROthiazide

## 2024-12-11 ENCOUNTER — TELEPHONE (OUTPATIENT)
Dept: ENDOCRINOLOGY | Age: 69
End: 2024-12-11

## 2024-12-11 NOTE — TELEPHONE ENCOUNTER
----- Message from Dr. Francisco Graff MD sent at 12/11/2024  8:18 AM EST -----  Her thyroid level was normal.

## 2025-03-04 ENCOUNTER — TRANSCRIBE ORDERS (OUTPATIENT)
Dept: SCHEDULING | Age: 70
End: 2025-03-04

## 2025-03-04 DIAGNOSIS — Z12.31 VISIT FOR SCREENING MAMMOGRAM: Primary | ICD-10-CM

## 2025-04-04 ENCOUNTER — HOSPITAL ENCOUNTER (OUTPATIENT)
Dept: MAMMOGRAPHY | Age: 70
Discharge: HOME | End: 2025-04-04
Payer: MEDICARE

## 2025-04-04 VITALS — HEIGHT: 60 IN | WEIGHT: 170 LBS | BODY MASS INDEX: 33.38 KG/M2

## 2025-04-04 DIAGNOSIS — Z12.31 VISIT FOR SCREENING MAMMOGRAM: ICD-10-CM

## 2025-04-04 PROCEDURE — 77063 BREAST TOMOSYNTHESIS BI: CPT

## 2025-07-07 ENCOUNTER — APPOINTMENT (OUTPATIENT)
Dept: GENERAL RADIOLOGY | Age: 70
End: 2025-07-07
Payer: MEDICARE

## 2025-07-07 ENCOUNTER — APPOINTMENT (OUTPATIENT)
Dept: CT IMAGING | Age: 70
End: 2025-07-07
Payer: MEDICARE

## 2025-07-07 ENCOUNTER — HOSPITAL ENCOUNTER (EMERGENCY)
Age: 70
Discharge: HOME | End: 2025-07-07
Attending: STUDENT IN AN ORGANIZED HEALTH CARE EDUCATION/TRAINING PROGRAM
Payer: MEDICARE

## 2025-07-07 VITALS
DIASTOLIC BLOOD PRESSURE: 52 MMHG | BODY MASS INDEX: 33.38 KG/M2 | TEMPERATURE: 99.2 F | HEART RATE: 69 BPM | HEIGHT: 60 IN | OXYGEN SATURATION: 95 % | RESPIRATION RATE: 25 BRPM | WEIGHT: 170 LBS | SYSTOLIC BLOOD PRESSURE: 103 MMHG

## 2025-07-07 DIAGNOSIS — R51.9 NONINTRACTABLE HEADACHE, UNSPECIFIED CHRONICITY PATTERN, UNSPECIFIED HEADACHE TYPE: ICD-10-CM

## 2025-07-07 DIAGNOSIS — R19.7 DIARRHEA, UNSPECIFIED TYPE: ICD-10-CM

## 2025-07-07 DIAGNOSIS — R11.0 NAUSEA: Primary | ICD-10-CM

## 2025-07-07 LAB
ALBUMIN SERPL-MCNC: 3.8 G/DL (ref 3.2–4.6)
ALBUMIN/GLOB SERPL: 1 (ref 1–1.9)
ALP SERPL-CCNC: 98 U/L (ref 35–104)
ALT SERPL-CCNC: 15 U/L (ref 8–45)
ANION GAP SERPL CALC-SCNC: 15 MMOL/L (ref 7–16)
APPEARANCE UR: CLEAR
AST SERPL-CCNC: 25 U/L (ref 15–37)
BACTERIA URNS QL MICRO: 0 /HPF
BASOPHILS # BLD: 0.03 K/UL (ref 0–0.2)
BASOPHILS NFR BLD: 0.3 % (ref 0–2)
BILIRUB SERPL-MCNC: 0.6 MG/DL (ref 0–1.2)
BILIRUB UR QL: NEGATIVE
BUN SERPL-MCNC: 13 MG/DL (ref 8–23)
CALCIUM SERPL-MCNC: 9.4 MG/DL (ref 8.8–10.2)
CASTS URNS QL MICRO: 0 /LPF
CHLORIDE SERPL-SCNC: 101 MMOL/L (ref 98–107)
CO2 SERPL-SCNC: 20 MMOL/L (ref 20–29)
COLOR UR: NORMAL
CREAT SERPL-MCNC: 0.89 MG/DL (ref 0.6–1.1)
CRYSTALS URNS QL MICRO: 0 /LPF
DIFFERENTIAL METHOD BLD: ABNORMAL
EOSINOPHIL # BLD: 0.04 K/UL (ref 0–0.8)
EOSINOPHIL NFR BLD: 0.4 % (ref 0.5–7.8)
EPI CELLS #/AREA URNS HPF: NORMAL /HPF
ERYTHROCYTE [DISTWIDTH] IN BLOOD BY AUTOMATED COUNT: 13 % (ref 11.9–14.6)
FLUAV RNA SPEC QL NAA+PROBE: NOT DETECTED
FLUBV RNA SPEC QL NAA+PROBE: NOT DETECTED
GLOBULIN SER CALC-MCNC: 3.9 G/DL (ref 2.3–3.5)
GLUCOSE SERPL-MCNC: 144 MG/DL (ref 70–99)
GLUCOSE UR STRIP.AUTO-MCNC: NEGATIVE MG/DL
HCT VFR BLD AUTO: 38.6 % (ref 35.8–46.3)
HGB BLD-MCNC: 13.3 G/DL (ref 11.7–15.4)
HGB UR QL STRIP: NEGATIVE
IMM GRANULOCYTES # BLD AUTO: 0.06 K/UL (ref 0–0.5)
IMM GRANULOCYTES NFR BLD AUTO: 0.6 % (ref 0–5)
KETONES UR QL STRIP.AUTO: NEGATIVE MG/DL
LACTATE SERPL-SCNC: 1.3 MMOL/L (ref 0.5–2)
LACTATE SERPL-SCNC: 2.4 MMOL/L (ref 0.5–2)
LEUKOCYTE ESTERASE UR QL STRIP.AUTO: NEGATIVE
LYMPHOCYTES # BLD: 1.06 K/UL (ref 0.5–4.6)
LYMPHOCYTES NFR BLD: 9.8 % (ref 13–44)
MCH RBC QN AUTO: 29.3 PG (ref 26.1–32.9)
MCHC RBC AUTO-ENTMCNC: 34.5 G/DL (ref 31.4–35)
MCV RBC AUTO: 85 FL (ref 82–102)
MONOCYTES # BLD: 0.3 K/UL (ref 0.1–1.3)
MONOCYTES NFR BLD: 2.8 % (ref 4–12)
MUCOUS THREADS URNS QL MICRO: 0 /LPF
NEUTS SEG # BLD: 9.3 K/UL (ref 1.7–8.2)
NEUTS SEG NFR BLD: 86.1 % (ref 43–78)
NITRITE UR QL STRIP.AUTO: NEGATIVE
NRBC # BLD: 0 K/UL (ref 0–0.2)
OTHER OBSERVATIONS: NORMAL
PH UR STRIP: 5 (ref 5–9)
PLATELET # BLD AUTO: 218 K/UL (ref 150–450)
PMV BLD AUTO: 10.1 FL (ref 9.4–12.3)
POTASSIUM SERPL-SCNC: 4.3 MMOL/L (ref 3.5–5.1)
PROCALCITONIN SERPL-MCNC: 0.18 NG/ML (ref 0–0.1)
PROT SERPL-MCNC: 7.7 G/DL (ref 6.3–8.2)
PROT UR STRIP-MCNC: NEGATIVE MG/DL
RBC # BLD AUTO: 4.54 M/UL (ref 4.05–5.2)
RBC #/AREA URNS HPF: 0 /HPF
SARS-COV-2 RDRP RESP QL NAA+PROBE: NOT DETECTED
SODIUM SERPL-SCNC: 136 MMOL/L (ref 136–145)
SOURCE: NORMAL
SP GR UR REFRACTOMETRY: 1.02 (ref 1–1.02)
URINE CULTURE IF INDICATED: NORMAL
UROBILINOGEN UR QL STRIP.AUTO: 0.2 EU/DL (ref 0.2–1)
WBC # BLD AUTO: 10.8 K/UL (ref 4.3–11.1)
WBC URNS QL MICRO: 0 /HPF
YEAST URNS QL MICRO: NORMAL

## 2025-07-07 PROCEDURE — 99284 EMERGENCY DEPT VISIT MOD MDM: CPT

## 2025-07-07 PROCEDURE — 70450 CT HEAD/BRAIN W/O DYE: CPT

## 2025-07-07 PROCEDURE — 6370000000 HC RX 637 (ALT 250 FOR IP): Performed by: STUDENT IN AN ORGANIZED HEALTH CARE EDUCATION/TRAINING PROGRAM

## 2025-07-07 PROCEDURE — 96365 THER/PROPH/DIAG IV INF INIT: CPT

## 2025-07-07 PROCEDURE — 80053 COMPREHEN METABOLIC PANEL: CPT

## 2025-07-07 PROCEDURE — 84145 PROCALCITONIN (PCT): CPT

## 2025-07-07 PROCEDURE — 96375 TX/PRO/DX INJ NEW DRUG ADDON: CPT

## 2025-07-07 PROCEDURE — 71045 X-RAY EXAM CHEST 1 VIEW: CPT

## 2025-07-07 PROCEDURE — 83605 ASSAY OF LACTIC ACID: CPT

## 2025-07-07 PROCEDURE — 87636 SARSCOV2 & INF A&B AMP PRB: CPT

## 2025-07-07 PROCEDURE — 85025 COMPLETE CBC W/AUTO DIFF WBC: CPT

## 2025-07-07 PROCEDURE — 96361 HYDRATE IV INFUSION ADD-ON: CPT

## 2025-07-07 PROCEDURE — 6360000002 HC RX W HCPCS: Performed by: STUDENT IN AN ORGANIZED HEALTH CARE EDUCATION/TRAINING PROGRAM

## 2025-07-07 PROCEDURE — 96366 THER/PROPH/DIAG IV INF ADDON: CPT

## 2025-07-07 PROCEDURE — 87040 BLOOD CULTURE FOR BACTERIA: CPT

## 2025-07-07 PROCEDURE — 2580000003 HC RX 258: Performed by: STUDENT IN AN ORGANIZED HEALTH CARE EDUCATION/TRAINING PROGRAM

## 2025-07-07 PROCEDURE — 81001 URINALYSIS AUTO W/SCOPE: CPT

## 2025-07-07 RX ORDER — IBUPROFEN 800 MG/1
800 TABLET, FILM COATED ORAL
Status: COMPLETED | OUTPATIENT
Start: 2025-07-07 | End: 2025-07-07

## 2025-07-07 RX ORDER — ONDANSETRON 2 MG/ML
4 INJECTION INTRAMUSCULAR; INTRAVENOUS
Status: COMPLETED | OUTPATIENT
Start: 2025-07-07 | End: 2025-07-07

## 2025-07-07 RX ORDER — SUCRALFATE 1 G/1
1 TABLET ORAL 4 TIMES DAILY
Qty: 120 TABLET | Refills: 3 | Status: SHIPPED | OUTPATIENT
Start: 2025-07-07

## 2025-07-07 RX ORDER — ONDANSETRON 4 MG/1
4 TABLET, FILM COATED ORAL 3 TIMES DAILY PRN
Qty: 15 TABLET | Refills: 2 | Status: SHIPPED | OUTPATIENT
Start: 2025-07-07

## 2025-07-07 RX ORDER — 0.9 % SODIUM CHLORIDE 0.9 %
1000 INTRAVENOUS SOLUTION INTRAVENOUS
Status: COMPLETED | OUTPATIENT
Start: 2025-07-07 | End: 2025-07-07

## 2025-07-07 RX ADMIN — CEFTRIAXONE SODIUM 2000 MG: 2 INJECTION, POWDER, FOR SOLUTION INTRAMUSCULAR; INTRAVENOUS at 18:16

## 2025-07-07 RX ADMIN — SODIUM CHLORIDE 1000 ML: 0.9 INJECTION, SOLUTION INTRAVENOUS at 17:45

## 2025-07-07 RX ADMIN — IBUPROFEN 800 MG: 800 TABLET, FILM COATED ORAL at 17:55

## 2025-07-07 RX ADMIN — ONDANSETRON 4 MG: 2 INJECTION, SOLUTION INTRAMUSCULAR; INTRAVENOUS at 17:55

## 2025-07-07 ASSESSMENT — LIFESTYLE VARIABLES
HOW MANY STANDARD DRINKS CONTAINING ALCOHOL DO YOU HAVE ON A TYPICAL DAY: PATIENT DOES NOT DRINK
HOW OFTEN DO YOU HAVE A DRINK CONTAINING ALCOHOL: NEVER

## 2025-07-07 ASSESSMENT — PAIN - FUNCTIONAL ASSESSMENT: PAIN_FUNCTIONAL_ASSESSMENT: 0-10

## 2025-07-07 ASSESSMENT — PAIN SCALES - GENERAL
PAINLEVEL_OUTOF10: 0
PAINLEVEL_OUTOF10: 8

## 2025-07-07 ASSESSMENT — PAIN DESCRIPTION - ORIENTATION: ORIENTATION: UPPER

## 2025-07-07 ASSESSMENT — PAIN DESCRIPTION - LOCATION: LOCATION: ABDOMEN

## 2025-07-07 NOTE — ED TRIAGE NOTES
Patient arrives via [facility] from home with CO HA since this morning. CO pain behind ear and lightheadedness. Hx vertigo has not taken medication today. T 100.3. bgl 161  500 NSS en route. Pt states \"when I was walking I just about passed out\"

## 2025-07-07 NOTE — ED PROVIDER NOTES
signed by Allegra Casas MD      XR CHEST PORTABLE    (Results Pending)                No results for input(s): \"COVID19\" in the last 72 hours.     Voice dictation software was used during the making of this note.  This software is not perfect and grammatical and other typographical errors may be present.  This note has not been completely proofread for errors.     Steve Gonzalez, DO  07/07/25 2102

## 2025-07-08 NOTE — DISCHARGE INSTRUCTIONS
Your lab work, x-ray, CT scan appear stable today.  As discussed you have been prescribed medication to manage your nausea, stomach upset as well as an antibiotic.  Use the Zofran and Carafate as needed for nausea, stomach upset/pain.  If you begin the antibiotic, continue this medication for the entire course of the prescription.  As discussed is imperative that you drink plenty of clear liquids, follow a bland diet until symptoms improved.  Return to this department for worsening symptoms, concerns or questions and arrange follow-up with your primary care provider within 1 week for recheck

## 2025-07-12 LAB
BACTERIA SPEC CULT: NORMAL
BACTERIA SPEC CULT: NORMAL
SERVICE CMNT-IMP: NORMAL
SERVICE CMNT-IMP: NORMAL

## (undated) DEVICE — SYR 5ML 1/5 GRAD LL NSAF LF --

## (undated) DEVICE — CONNECTOR TBNG OD5-7MM O2 END DISP

## (undated) DEVICE — NDL PRT INJ NSAF BLNT 18GX1.5 --

## (undated) DEVICE — CANNULA NSL ORAL AD FOR CAPNOFLEX CO2 O2 AIRLFE

## (undated) DEVICE — KENDALL RADIOLUCENT FOAM MONITORING ELECTRODE RECTANGULAR SHAPE: Brand: KENDALL

## (undated) DEVICE — SYR 3ML LL TIP 1/10ML GRAD --